# Patient Record
Sex: FEMALE | Race: WHITE | NOT HISPANIC OR LATINO | URBAN - NONMETROPOLITAN AREA
[De-identification: names, ages, dates, MRNs, and addresses within clinical notes are randomized per-mention and may not be internally consistent; named-entity substitution may affect disease eponyms.]

---

## 2016-08-11 PROBLEM — E11.3293: Noted: 2020-05-15

## 2016-08-11 PROBLEM — E11.3292: Noted: 2019-05-17

## 2019-05-17 ENCOUNTER — IMPORTED ENCOUNTER (OUTPATIENT)
Dept: URBAN - NONMETROPOLITAN AREA CLINIC 1 | Facility: CLINIC | Age: 54
End: 2019-05-17

## 2019-05-17 PROCEDURE — 99214 OFFICE O/P EST MOD 30 MIN: CPT

## 2019-05-17 PROCEDURE — 92015 DETERMINE REFRACTIVE STATE: CPT

## 2019-05-17 PROCEDURE — 92250 FUNDUS PHOTOGRAPHY W/I&R: CPT

## 2019-05-17 NOTE — PATIENT DISCUSSION
IDDM with Mild NPDR OSDiscussed diagnosis with patient. Discussed the risk of diabetic damage of the retina with potential vision loss and the importance of routine follow-up. Emphasized strict blood sugar control. Optos done today: shows dot blots Wendy send notes to endocrinologist today Saurabh Reyes to monitor. RTC in 6 months with OCT Presbyopia OUDiscussed refractive status with patientNew glasses Rx given todayMonitor yearly or PRN; 's Notes: Raj Marcus [facility] [id number] MR  5/17/19DFE  5/17/19Optos 5/17/19

## 2019-11-15 ENCOUNTER — IMPORTED ENCOUNTER (OUTPATIENT)
Dept: URBAN - NONMETROPOLITAN AREA CLINIC 1 | Facility: CLINIC | Age: 54
End: 2019-11-15

## 2019-11-15 PROCEDURE — 92134 CPTRZ OPH DX IMG PST SGM RTA: CPT

## 2019-11-15 PROCEDURE — 99214 OFFICE O/P EST MOD 30 MIN: CPT

## 2019-11-15 NOTE — PATIENT DISCUSSION
IDDM with Mild NPDR OSDiscussed diagnosis with patient. Discussed the risk of diabetic damage of the retina with potential vision loss and the importance of routine follow-up. Emphasized strict blood sugar control. OCT done today no abnormal macular findingsOptos done previously showed dot blots Dorill send notes to endocrinologist today Barby LOUISontinue to [facility] in 6 months with Colleen Henry OUDiscussed refractive status with patientNew glasses Rx given last visitMonitor yearly or PRN; Dr's Notes: Endo:  Barby Coleman [facility] [id number] MR  5/17/19DFE  11/15/19Optos 5/17/19OCT mac 11/15/19

## 2020-05-15 ENCOUNTER — IMPORTED ENCOUNTER (OUTPATIENT)
Dept: URBAN - NONMETROPOLITAN AREA CLINIC 1 | Facility: CLINIC | Age: 55
End: 2020-05-15

## 2020-05-15 PROBLEM — E11.3293: Noted: 2020-05-15

## 2020-05-15 PROBLEM — H25.13: Noted: 2021-11-22

## 2020-05-15 PROBLEM — H52.4: Noted: 2021-11-22

## 2020-05-15 PROCEDURE — 99214 OFFICE O/P EST MOD 30 MIN: CPT

## 2020-05-15 PROCEDURE — 92250 FUNDUS PHOTOGRAPHY W/I&R: CPT

## 2020-05-15 NOTE — PATIENT DISCUSSION
IDDM with Mild NPDR OUDiscussed diagnosis with patient. Discussed the risk of diabetic damage of the retina with potential vision loss and the importance of routine follow-up. Emphasized strict blood sugar control. Optos done today showed dot blots JOSHWill send notes to endocrinologist today Mercedes Reyes to [facility] in 6 months with OCTPresbyopia OUDiscussed refractive status with patient. New glasses Rx given today. Monitor yearly or PRN; Dr's Notes: Endo:  Mercedes Adams [address] [id number] MR  5/15/20DFE  11/15/19Optos 5/15/20OCT mac 11/15/19

## 2020-11-16 ENCOUNTER — IMPORTED ENCOUNTER (OUTPATIENT)
Dept: URBAN - NONMETROPOLITAN AREA CLINIC 1 | Facility: CLINIC | Age: 55
End: 2020-11-16

## 2020-11-16 PROCEDURE — 99214 OFFICE O/P EST MOD 30 MIN: CPT

## 2020-11-16 PROCEDURE — 92134 CPTRZ OPH DX IMG PST SGM RTA: CPT

## 2020-11-16 NOTE — PATIENT DISCUSSION
IDDM with Mild NPDR OUDiscussed diagnosis with patient. Discussed the risk of diabetic damage of the retina with potential vision loss and the importance of routine follow-up. Emphasized strict blood sugar control. Will send notes to endocrinologist today Mercedes Reyes to [facility] in 6 months with OptosPresbyopia OUDiscussed refractive status with patient. New glasses Rx given today. Monitor yearly or PRN; Dr's Notes: Endo:  Mercedes Adams [address] [id number] MR  11/16/20DFE  11/16/20Optos 5/15/20OCT mac 11/16/20

## 2021-03-31 NOTE — PATIENT DISCUSSION
Patient only notices when in the car at distance. Doesn't want to wear glasses at this time to fix it. Educated if it worsens we can try prism in the glasses at a later date.

## 2021-05-17 ENCOUNTER — IMPORTED ENCOUNTER (OUTPATIENT)
Dept: URBAN - NONMETROPOLITAN AREA CLINIC 1 | Facility: CLINIC | Age: 56
End: 2021-05-17

## 2021-05-17 PROCEDURE — 99214 OFFICE O/P EST MOD 30 MIN: CPT

## 2021-05-17 PROCEDURE — 92250 FUNDUS PHOTOGRAPHY W/I&R: CPT

## 2021-05-17 NOTE — PATIENT DISCUSSION
IDDM with Mild NPDR OUDiscussed diagnosis with patient. Discussed the risk of diabetic damage of the retina with potential vision loss and the importance of routine follow-up. Emphasized strict blood sugar control. Optos done today; stable from previous with trace dot/blots OU. Will send notes to endocrinologist today Li Reyes to [facility] in 6 months with OCTPresbyopia OUDiscussed refractive status with patient. Monitor yearly or PRN; 's Notes: Endo:  Li Austin [address] [id number] MR  11/16/20DFE  11/16/20Optos 5/17/21OCT mac 11/16/20

## 2021-11-22 ENCOUNTER — IMPORTED ENCOUNTER (OUTPATIENT)
Dept: URBAN - NONMETROPOLITAN AREA CLINIC 1 | Facility: CLINIC | Age: 56
End: 2021-11-22

## 2021-11-22 PROBLEM — H25.13: Noted: 2021-11-22

## 2021-11-22 PROBLEM — H52.4: Noted: 2021-11-22

## 2021-11-22 PROCEDURE — 99214 OFFICE O/P EST MOD 30 MIN: CPT

## 2021-11-22 PROCEDURE — 92134 CPTRZ OPH DX IMG PST SGM RTA: CPT

## 2021-11-22 PROCEDURE — 92015 DETERMINE REFRACTIVE STATE: CPT

## 2021-11-22 NOTE — PATIENT DISCUSSION
IDDM with Mild NPDR OUDiscussed diagnosis with patient. Discussed the risk of diabetic damage of the retina with potential vision loss and the importance of routine follow-up. Emphasized strict blood sugar control. OCT done today; stable from previous with no subretina fluid OU. Will send notes to endocrinologist today [facility] [id number] [facility] OUDiscussed diagnosis in detail with patient. Reviewed symptoms related to cataract progression. No treatment required at this time. Continue to monitor. Presbyopia OUDiscussed refractive status with patient. New glasses Rx given today. Monitor yearly or PRN; 's Notes: Endo:  Eben Hodges [address] [id number] MR  11/22/21DFE  11/22/21Optos 5/17/21OCT mac 11/22/21

## 2022-04-09 ASSESSMENT — TONOMETRY
OD_IOP_MMHG: 16
OD_IOP_MMHG: 17
OS_IOP_MMHG: 18
OD_IOP_MMHG: 14
OD_IOP_MMHG: 17
OD_IOP_MMHG: 16
OS_IOP_MMHG: 16
OS_IOP_MMHG: 16
OD_IOP_MMHG: 15
OS_IOP_MMHG: 18
OS_IOP_MMHG: 15
OS_IOP_MMHG: 15

## 2022-04-09 ASSESSMENT — VISUAL ACUITY
OS_SC: 20/20-
OS_SC: 20/20
OS_SC: 20/30
OS_SC: 20/20
OD_SC: 20/20-
OS_SC: 20/20-
OD_SC: 20/20
OD_SC: 20/25
OD_SC: 20/20
OD_SC: 20/20
OS_SC: 20/20
OD_SC: 20/20

## 2022-05-23 ENCOUNTER — FOLLOW UP (OUTPATIENT)
Dept: URBAN - NONMETROPOLITAN AREA CLINIC 1 | Facility: CLINIC | Age: 57
End: 2022-05-23

## 2022-05-23 DIAGNOSIS — E11.3293: ICD-10-CM

## 2022-05-23 PROCEDURE — 92250 FUNDUS PHOTOGRAPHY W/I&R: CPT

## 2022-05-23 PROCEDURE — 99214 OFFICE O/P EST MOD 30 MIN: CPT

## 2022-05-23 ASSESSMENT — VISUAL ACUITY
OS_CC: 20/20-1
OD_CC: 20/20-1

## 2022-05-23 ASSESSMENT — TONOMETRY
OD_IOP_MMHG: 20
OS_IOP_MMHG: 20

## 2022-11-28 ENCOUNTER — COMPREHENSIVE EXAM (OUTPATIENT)
Dept: URBAN - NONMETROPOLITAN AREA CLINIC 1 | Facility: CLINIC | Age: 57
End: 2022-11-28

## 2022-11-28 DIAGNOSIS — E11.3293: ICD-10-CM

## 2022-11-28 PROCEDURE — 99214 OFFICE O/P EST MOD 30 MIN: CPT

## 2022-11-28 PROCEDURE — 92134 CPTRZ OPH DX IMG PST SGM RTA: CPT

## 2022-11-28 ASSESSMENT — VISUAL ACUITY
OD_CC: 20/20
OS_CC: 20/20

## 2022-11-28 ASSESSMENT — TONOMETRY
OD_IOP_MMHG: 16
OS_IOP_MMHG: 16

## 2023-06-05 ENCOUNTER — FOLLOW UP (OUTPATIENT)
Dept: URBAN - NONMETROPOLITAN AREA CLINIC 1 | Facility: CLINIC | Age: 58
End: 2023-06-05

## 2023-06-05 DIAGNOSIS — H25.13: ICD-10-CM

## 2023-06-05 DIAGNOSIS — E11.3293: ICD-10-CM

## 2023-06-05 PROCEDURE — 92134 CPTRZ OPH DX IMG PST SGM RTA: CPT

## 2023-06-05 PROCEDURE — 99214 OFFICE O/P EST MOD 30 MIN: CPT

## 2023-06-05 ASSESSMENT — VISUAL ACUITY
OU_CC: 20/20
OD_CC: 20/20-1
OS_CC: 20/22

## 2023-06-05 ASSESSMENT — TONOMETRY
OD_IOP_MMHG: 16
OS_IOP_MMHG: 16

## 2023-12-11 ENCOUNTER — ESTABLISHED PATIENT (OUTPATIENT)
Dept: URBAN - NONMETROPOLITAN AREA CLINIC 1 | Facility: CLINIC | Age: 58
End: 2023-12-11

## 2023-12-11 DIAGNOSIS — H25.13: ICD-10-CM

## 2023-12-11 DIAGNOSIS — E11.9: ICD-10-CM

## 2023-12-11 PROCEDURE — 99214 OFFICE O/P EST MOD 30 MIN: CPT

## 2023-12-11 PROCEDURE — 92250 FUNDUS PHOTOGRAPHY W/I&R: CPT

## 2023-12-11 ASSESSMENT — TONOMETRY
OD_IOP_MMHG: 17
OS_IOP_MMHG: 16

## 2023-12-11 ASSESSMENT — VISUAL ACUITY
OS_CC: 20/20
OD_CC: 20/20
OU_CC: 20/20

## 2024-04-26 NOTE — PATIENT DISCUSSION
Patient educated on condition. none You can access the FollowMyHealth Patient Portal offered by [facility] by registering at the following website: [email or web address]. By joining [employer]’s FollowMyHealth portal, you will also be able to view your health information using other applications (apps) compatible with our system.

## 2024-12-12 ENCOUNTER — COMPREHENSIVE EXAM (OUTPATIENT)
Age: 59
End: 2024-12-12

## 2024-12-12 DIAGNOSIS — H52.4: ICD-10-CM

## 2024-12-12 PROCEDURE — S0621 ROUTINE OPHTHALMOLOGICAL EXA: HCPCS
